# Patient Record
Sex: FEMALE | Race: WHITE | NOT HISPANIC OR LATINO | URBAN - METROPOLITAN AREA
[De-identification: names, ages, dates, MRNs, and addresses within clinical notes are randomized per-mention and may not be internally consistent; named-entity substitution may affect disease eponyms.]

---

## 2017-07-27 ENCOUNTER — GENERIC CONVERSION - ENCOUNTER (OUTPATIENT)
Dept: OTHER | Facility: OTHER | Age: 82
End: 2017-07-27

## 2017-08-03 ENCOUNTER — ALLSCRIPTS OFFICE VISIT (OUTPATIENT)
Dept: OTHER | Facility: OTHER | Age: 82
End: 2017-08-03

## 2017-08-31 ENCOUNTER — ALLSCRIPTS OFFICE VISIT (OUTPATIENT)
Dept: OTHER | Facility: OTHER | Age: 82
End: 2017-08-31

## 2018-01-12 NOTE — PROGRESS NOTES
Procedure    Procedure: Berkowitz Catheter Change   Berkowitz catheter removed  Pt groin area is sore, a little reddened with old stool  Her hygiene is not good  I cleaned the area well with Personal Cleaning Cloths  A 16 Frisian Berkowitz catheter was inserted into the bladder using sterile technique  The patient was taught routine catheter care  A leg bag was connected  Instructed pt that she should use the ointment that she has on the areas  Plan  Simple catheter change in 4-6 weeks       Future Appointments    Date/Time Provider Specialty Site   08[facility]31[facility]2017 [facility]   Electronically signed by : rBittany Guilluame, ; Aug  3 2017  1:58PM EST                       (Co-author)

## 2018-01-12 NOTE — MISCELLANEOUS
Message  Received a request for a medication switch from Tolterodine to Oxybutynin  Per my conversation with Dr Mike Roche, the patient is no longer taking any anticholinergic as they were all ineffective  She now has a chronic Berkowitz catheter  The request was cancelled and scanned into Med ePad for future reference  Oringal faxed to [facility]/pharmacy #[id number] [address] , fax #[phone]        Signatures   Electronically signed by : Favian Clinton, ; Jul 27 2017  2:40PM EST                       (Author)

## 2018-01-17 NOTE — PROGRESS NOTES
Chief Complaint  Pt  presents for routine Berkowitz catheter change  Active Problems   1  Overactive bladder (596 51) (N32 81)    Current Meds  1  Aspirin 81 MG TABS; Therapy: (Recorded:[id number]) to Recorded  2  Azo-Cranberry 450 MG TABS; Therapy: (Recorded:[id number]) to Recorded  3  Calcium + D 250-125 MG-UNIT TABS; Therapy: (Recorded:31Aug2017) to Recorded  4  Enalapril Maleate 10 MG Oral Tablet; Therapy: (Recorded:31Aug2017) to Recorded  5  Furosemide 20 MG Oral Tablet; Therapy: (Recorded:31Aug2017) to Recorded  6  GenTeal 0 25-0 3 % Ophthalmic Gel; Therapy: (Recorded:31Aug2017) to Recorded  7  Levothyroxine Sodium 50 MCG Oral Tablet; Therapy: (Recorded:31Aug2017) to Recorded  8  Metoprolol Tartrate 25 MG Oral Tablet; Therapy: (Recorded:31Aug2017) to Recorded  9  Pyridium 100 MG Oral Tablet; Therapy: (Recorded:31Aug2017) to Recorded  10  Tolterodine Tartrate 2 MG Oral Tablet; Take 1 tablet daily; Therapy: [id number] to (Evaluate:13Mar2018)  Requested for: [id number]; Last    Rx:[id number] Ordered  11  Trimethoprim 100 MG Oral Tablet; Therapy: (Recorded:74Ucq1998) to Recorded    Allergies   1  Anesthesia IV Set MISC  2  Cephalexin TABS  3  Cipro  4  Claritin TABS  5  Codeine Derivatives  6  Lipitor TABS  7  Loratadine TABS  8  Macrobid  9  Myrbetriq TB24  10  Sulfa Drugs    Procedure    Procedure: Berkowitz Catheter Change   Pt  was placed it the supine position with knees bent and legs apart  Pt's indwelling catheter was removed without complications  A 14 Belgian Berkowitz was inserted into the bladder using sterile technique and a leg bag was attached  Pt  brings her own supplies  She has no questions or concerns and is happy with her care  Assessment   1  History of Cath Stent Placement  2  History of Cataract Surgery  3  History of Colonoscopic Polypectomy Via Colostomy  4  History of Colonoscopy  5  History of Eye Surgery  6  History of Biopsy Skin  7   History of Acute urinary retention (788 29) (R33 8)  8  History of chronic cystitis (V13 00) (Z87 448)  9  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)  10  History of urinary frequency (V13 09) (Z87 898)  11  History of urinary tract infection (V13 02) (Z87 440)  12  History of Cataract, acquired (366 9) (H26 9)  13  History of dermatitis (V13 3) (Z87 2)  14  History of diverticulosis (V12 79) (Z87 19)  15  History of hypertension (V12 59) (Z86 79)  16  History of skin cancer (V10 83) (Z85 828)  17  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